# Patient Record
Sex: FEMALE | Race: WHITE | URBAN - METROPOLITAN AREA
[De-identification: names, ages, dates, MRNs, and addresses within clinical notes are randomized per-mention and may not be internally consistent; named-entity substitution may affect disease eponyms.]

---

## 2024-04-16 ENCOUNTER — APPOINTMENT (RX ONLY)
Dept: URBAN - METROPOLITAN AREA CLINIC 356 | Facility: CLINIC | Age: 64
Setting detail: DERMATOLOGY
End: 2024-04-16

## 2024-04-16 PROBLEM — D23.9 OTHER BENIGN NEOPLASM OF SKIN, UNSPECIFIED: Status: ACTIVE | Noted: 2024-04-16

## 2024-04-16 PROCEDURE — ? PREVENTATIVE SKIN CANCER SCREENING

## 2024-04-16 PROCEDURE — ? ADDITIONAL NOTES

## 2024-04-16 NOTE — PROCEDURE: PREVENTATIVE SKIN CANCER SCREENING
Initial Screening Text: Skin cancer screening was performed on exposed skin.  Lesions were identified that should be followed up for further evaluation.  \\n\\nWe recommend:  1)  Schedule an appointment to follow-up in the office for further exam/testing  2) Full Body Skin Exam is encouraged annually, 3) Daily use of broad spectrum SPF 30+ Sunscreen.
Detail Level: Simple
Initial Vs Subsequent Screenings?: Initial
Billing Warning: If you want to bill the  [id number] cpt codes you must manually override the bill.

## 2024-04-16 NOTE — PROCEDURE: ADDITIONAL NOTES
Render Risk Assessment In Note?: yes
Additional Notes: History of Malignant Melanoma
Detail Level: Generalized

## 2024-04-16 NOTE — PROCEDURE: MIPS QUALITY
Quality 137: Melanoma: Continuity Of Care - Recall System: Patient information entered into a recall system that includes: target date for the next exam specified AND a process to follow up with patients regarding missed or unscheduled appointments
Quality 130: Documentation Of Current Medications In The Medical Record: Current Medications Documented
Quality 431: Preventive Care And Screening: Unhealthy Alcohol Use - Screening: Patient not identified as an unhealthy alcohol user when screened for unhealthy alcohol use using a systematic screening method
Quality 226: Preventive Care And Screening: Tobacco Use: Screening And Cessation Intervention: Patient screened for tobacco use and is an ex/non-smoker
Detail Level: Detailed